# Patient Record
Sex: FEMALE | Race: WHITE
[De-identification: names, ages, dates, MRNs, and addresses within clinical notes are randomized per-mention and may not be internally consistent; named-entity substitution may affect disease eponyms.]

---

## 2019-12-21 ENCOUNTER — HOSPITAL ENCOUNTER (EMERGENCY)
Dept: HOSPITAL 59 - ER | Age: 34
Discharge: HOME | End: 2019-12-21
Payer: COMMERCIAL

## 2019-12-21 DIAGNOSIS — R13.10: ICD-10-CM

## 2019-12-21 DIAGNOSIS — T78.1XXA: Primary | ICD-10-CM

## 2019-12-21 PROCEDURE — 96375 TX/PRO/DX INJ NEW DRUG ADDON: CPT

## 2019-12-21 PROCEDURE — 99284 EMERGENCY DEPT VISIT MOD MDM: CPT

## 2019-12-21 PROCEDURE — 96374 THER/PROPH/DIAG INJ IV PUSH: CPT

## 2019-12-21 RX ADMIN — ONDANSETRON ONE MG: 2 INJECTION INTRAMUSCULAR; INTRAVENOUS at 21:42

## 2019-12-21 RX ADMIN — FAMOTIDINE ONE MG: 10 INJECTION INTRAVENOUS at 21:39

## 2019-12-21 RX ADMIN — SODIUM CHLORIDE SCH MLS/HR: 0.9 INJECTION, SOLUTION INTRAVENOUS at 21:38

## 2019-12-21 RX ADMIN — METHYLPREDNISOLONE SODIUM SUCCINATE ONE MG: 125 INJECTION, POWDER, FOR SOLUTION INTRAMUSCULAR; INTRAVENOUS at 21:36

## 2019-12-21 RX ADMIN — DIPHENHYDRAMINE HYDROCHLORIDE ONE MG: 50 INJECTION, SOLUTION INTRAMUSCULAR; INTRAVENOUS at 21:35

## 2019-12-21 NOTE — EMERGENCY DEPARTMENT RECORD
History of Present Illness





- General


Stated complaint: ALLERGIC REACTION


Time Seen by Provider: 12/21/19 21:30


Source: Patient


Mode of Arrival: Ambulatory


Limitations: No limitations





- History of Present Illness


Initial Comments: 





33 yo female with a previous history of allergic reactions to nuts presents to 

ED for evaluation of throat "itching" symptoms after eating apiece of candy.  

Patient does report taking Benadryl PTA, reports that she has never required 

epinephrine for previous reactions.  Patient denies wheezing or rashes on 

examination.  Patient denies health problems at her baseline.


MD Complaint: Allergic reaction


Onset/Timing: 10


-: Minutes(s)


Exposure: Food


Symptoms: Difficulty swallowing


Severity: Mild


Treatment Prior to Arrival: Benadryl


Previous Allergy History: Other





- Related Data


                                Home Medications











 Medication  Instructions  Recorded  Confirmed  Last Taken


 


Norethindrone AC-Eth Estradiol  12/21/19 12/21/19





[Junel 1 mg-20 Mcg Tablet]    








                                  Previous Rx's











 Medication  Instructions  Recorded


 


Cimetidine [Tagamet] 300 mg PO DAILY #15 tab 12/21/19


 


Prednisone [Prednisone 20Mg] 20 mg PO TID #9 tab 12/21/19











                                    Allergies











Allergy/AdvReac Type Severity Reaction Status Date / Time


 


aspirin Allergy  RASH Verified 12/21/19 21:45














Review of Systems


Constitutional: Denies: Chills, Fever, Malaise, Night sweats


Eyes: Denies: Eye discharge, Eye pain


ENT: Reports: Other (Throat itching).  Denies: Congestion, Ear pain, Epistaxis


Respiratory: Denies: Cough, Dyspnea


Cardiovascular: Denies: Chest pain, Dyspnea on exertion


Endocrine: Denies: Fatigue, Heat or cold intolerance


Gastrointestinal: Denies: Abdominal pain, Nausea, Vomiting


Genitourinary: Denies: Incontinence, Retention


Musculoskeletal: Denies: Arthralgia, Back pain


Skin: Denies: Bruising, Change in color


Neurological: Denies: Abnormal gait, Confusion, Headache, Seizure


Psychiatric: Denies: Anxiety


Hematological/Lymphatic: Denies: Anemia, Blood Clots





Physical Exam





- General


General Appearance: Alert, Oriented x3, Cooperative, Mild distress


Limitations: No limitations





- Head


Head exam: Atraumatic, Normocephalic, Normal inspection


Head exam detail: negative: Abrasion, Contusion, Sanabria's sign, General 

tenderness, Hematoma, Laceration





- Eye


Eye exam: Normal appearance.  negative: Conjunctival injection, Periorbital 

swelling, Periorbital tenderness, Scleral icterus





- ENT


Ear exam: negative: Auricular hematoma, Auricular trauma


Nasal Exam: negative: Active bleeding, Discharge, Dried blood, Foreign body


Mouth exam: negative: Drooling, Laceration, Muffled voice, Tongue elevation





- Neck


Neck exam: Normal inspection.  negative: Meningismus, Tenderness





- Respiratory


Respiratory exam: Normal lung sounds bilaterally.  negative: Rales, Respiratory 

distress, Rhonchi, Stridor





- Cardiovascular


Cardiovascular Exam: Regular rate, Normal rhythm, Normal heart sounds





- GI/Abdominal


GI/Abdominal exam: Soft.  negative: Rebound, Rigid, Tenderness





- Rectal


Rectal exam: Deferred





- 


 exam: Deferred





- Extremities


Extremities exam: Normal inspection.  negative: Pedal edema, Tenderness





- Back


Back exam: Denies: CVA tenderness (R), CVA tenderness (L)





- Neurological


Neurological exam: Alert, Normal gait, Oriented X3





- Psychiatric


Psychiatric exam: Normal affect, Normal mood





- Skin


Skin exam: Normal color.  negative: Abrasion


Type of lesion: negative: abrasion





Course





- Reevaluation(s)


Reevaluation #1: 





12/21/19 21:59


Patient was reassessed a this time, resting more comfortably.


Patient reports that her throat symptoms are improving.








Reevaluation #2: 





12/21/19 23:10


Patient was reassessed and reports that her symptoms continue to improve.


Patient appears stable for discharge with continued outpatient treatment for 

recurrent symptoms (prednisone/tagamet).


Patient is in agreement with the plan of care as discussed.





Disposition


Disposition: Discharge


Clinical Impression: 


Allergic reaction


Qualifiers:


 Encounter type: initial encounter Qualified Code(s): T78.40XA - Allergy, 

unspecified, initial encounter





Disposition: Home, Self-Care


Condition: (2) Stable


Instructions:  General Allergic Reaction (ED)


Additional Instructions: 


Return to ED if your symptoms worsen or if you have any concerns.


Prednisone, tagamet as directed.


Follow-up with your family doctor in 3-5 days as directed.


Prescriptions: 


Prednisone [Prednisone 20Mg] 20 mg PO TID #9 tab


Cimetidine [Tagamet] 300 mg PO DAILY #15 tab


Time of Disposition: 23:11





Quality





- Quality Measures


Quality Measures: N/A





- Blood Pressure Screening


Does Patient Have Any of the Following: No


Blood Pressure Classification: Hypertensive Reading


Systolic Measurement: 144


Diastolic Measurement: 93


Screening for High Blood Pressure: < First Hypertensive BP, F/U Documented > 

[]


First Hypertensive Follow-up Interventions: Referral to alternative/primary care

 provider.